# Patient Record
Sex: MALE | Race: WHITE | NOT HISPANIC OR LATINO | Employment: OTHER | URBAN - METROPOLITAN AREA
[De-identification: names, ages, dates, MRNs, and addresses within clinical notes are randomized per-mention and may not be internally consistent; named-entity substitution may affect disease eponyms.]

---

## 2020-05-27 ENCOUNTER — HOSPITAL ENCOUNTER (OUTPATIENT)
Facility: MEDICAL CENTER | Age: 65
End: 2020-05-27
Attending: PHYSICIAN ASSISTANT
Payer: COMMERCIAL

## 2020-05-27 ENCOUNTER — HOSPITAL ENCOUNTER (OUTPATIENT)
Dept: RADIOLOGY | Facility: MEDICAL CENTER | Age: 65
End: 2020-05-27
Attending: PHYSICIAN ASSISTANT
Payer: COMMERCIAL

## 2020-05-27 ENCOUNTER — APPOINTMENT (OUTPATIENT)
Dept: RADIOLOGY | Facility: IMAGING CENTER | Age: 65
End: 2020-05-27
Attending: PHYSICIAN ASSISTANT
Payer: COMMERCIAL

## 2020-05-27 ENCOUNTER — OFFICE VISIT (OUTPATIENT)
Dept: URGENT CARE | Facility: CLINIC | Age: 65
End: 2020-05-27
Payer: COMMERCIAL

## 2020-05-27 VITALS
HEART RATE: 102 BPM | SYSTOLIC BLOOD PRESSURE: 112 MMHG | OXYGEN SATURATION: 95 % | RESPIRATION RATE: 14 BRPM | TEMPERATURE: 97.3 F | DIASTOLIC BLOOD PRESSURE: 92 MMHG | BODY MASS INDEX: 28.18 KG/M2 | HEIGHT: 73 IN | WEIGHT: 212.6 LBS

## 2020-05-27 DIAGNOSIS — R51.9 ACUTE NONINTRACTABLE HEADACHE, UNSPECIFIED HEADACHE TYPE: ICD-10-CM

## 2020-05-27 DIAGNOSIS — G44.85 PRIMARY STABBING HEADACHE: ICD-10-CM

## 2020-05-27 DIAGNOSIS — R50.9 FEVER, UNSPECIFIED FEVER CAUSE: ICD-10-CM

## 2020-05-27 LAB
ANION GAP SERPL CALC-SCNC: 15 MMOL/L (ref 7–16)
APPEARANCE UR: NORMAL
BASOPHILS # BLD AUTO: 0.3 % (ref 0–1.8)
BASOPHILS # BLD: 0.02 K/UL (ref 0–0.12)
BILIRUB UR STRIP-MCNC: NEGATIVE MG/DL
BUN SERPL-MCNC: 21 MG/DL (ref 8–22)
CALCIUM SERPL-MCNC: 8.3 MG/DL (ref 8.5–10.5)
CHLORIDE SERPL-SCNC: 98 MMOL/L (ref 96–112)
CO2 SERPL-SCNC: 21 MMOL/L (ref 20–33)
COLOR UR AUTO: NORMAL
CREAT SERPL-MCNC: 1.54 MG/DL (ref 0.5–1.4)
EOSINOPHIL # BLD AUTO: 0.04 K/UL (ref 0–0.51)
EOSINOPHIL NFR BLD: 0.5 % (ref 0–6.9)
ERYTHROCYTE [DISTWIDTH] IN BLOOD BY AUTOMATED COUNT: 46.6 FL (ref 35.9–50)
FLUAV+FLUBV AG SPEC QL IA: NEGATIVE
GLUCOSE SERPL-MCNC: 130 MG/DL (ref 65–99)
GLUCOSE UR STRIP.AUTO-MCNC: NEGATIVE MG/DL
HCT VFR BLD AUTO: 38.6 % (ref 42–52)
HGB BLD-MCNC: 12.6 G/DL (ref 14–18)
IMM GRANULOCYTES # BLD AUTO: 0.1 K/UL (ref 0–0.11)
IMM GRANULOCYTES NFR BLD AUTO: 1.3 % (ref 0–0.9)
INT CON NEG: NEGATIVE
INT CON POS: POSITIVE
KETONES UR STRIP.AUTO-MCNC: NEGATIVE MG/DL
LEUKOCYTE ESTERASE UR QL STRIP.AUTO: NEGATIVE
LYMPHOCYTES # BLD AUTO: 0.53 K/UL (ref 1–4.8)
LYMPHOCYTES NFR BLD: 7 % (ref 22–41)
MCH RBC QN AUTO: 28.9 PG (ref 27–33)
MCHC RBC AUTO-ENTMCNC: 32.6 G/DL (ref 33.7–35.3)
MCV RBC AUTO: 88.5 FL (ref 81.4–97.8)
MONOCYTES # BLD AUTO: 0.22 K/UL (ref 0–0.85)
MONOCYTES NFR BLD AUTO: 2.9 % (ref 0–13.4)
NEUTROPHILS # BLD AUTO: 6.63 K/UL (ref 1.82–7.42)
NEUTROPHILS NFR BLD: 88 % (ref 44–72)
NITRITE UR QL STRIP.AUTO: NEGATIVE
NRBC # BLD AUTO: 0 K/UL
NRBC BLD-RTO: 0 /100 WBC
PH UR STRIP.AUTO: 5.5 [PH] (ref 5–8)
PLATELET # BLD AUTO: 219 K/UL (ref 164–446)
PMV BLD AUTO: 9.9 FL (ref 9–12.9)
POTASSIUM SERPL-SCNC: 4.1 MMOL/L (ref 3.6–5.5)
PROT UR QL STRIP: NEGATIVE MG/DL
RBC # BLD AUTO: 4.36 M/UL (ref 4.7–6.1)
RBC UR QL AUTO: NEGATIVE
SODIUM SERPL-SCNC: 134 MMOL/L (ref 135–145)
SP GR UR STRIP.AUTO: 1.02
UROBILINOGEN UR STRIP-MCNC: 0.2 MG/DL
WBC # BLD AUTO: 7.5 K/UL (ref 4.8–10.8)

## 2020-05-27 PROCEDURE — 70450 CT HEAD/BRAIN W/O DYE: CPT

## 2020-05-27 PROCEDURE — 81002 URINALYSIS NONAUTO W/O SCOPE: CPT | Performed by: PHYSICIAN ASSISTANT

## 2020-05-27 PROCEDURE — 87086 URINE CULTURE/COLONY COUNT: CPT

## 2020-05-27 PROCEDURE — 85025 COMPLETE CBC W/AUTO DIFF WBC: CPT

## 2020-05-27 PROCEDURE — 99214 OFFICE O/P EST MOD 30 MIN: CPT | Performed by: PHYSICIAN ASSISTANT

## 2020-05-27 PROCEDURE — 71046 X-RAY EXAM CHEST 2 VIEWS: CPT | Mod: TC | Performed by: PHYSICIAN ASSISTANT

## 2020-05-27 PROCEDURE — 87804 INFLUENZA ASSAY W/OPTIC: CPT | Performed by: PHYSICIAN ASSISTANT

## 2020-05-27 PROCEDURE — 80048 BASIC METABOLIC PNL TOTAL CA: CPT

## 2020-05-27 ASSESSMENT — ENCOUNTER SYMPTOMS
NECK PAIN: 0
SPUTUM PRODUCTION: 0
SPEECH CHANGE: 0
TINGLING: 0
WHEEZING: 0
SHORTNESS OF BREATH: 1
STRIDOR: 0
CHILLS: 1
DIZZINESS: 0
PALPITATIONS: 0
WEIGHT LOSS: 0
HEARTBURN: 0
FLANK PAIN: 0
SENSORY CHANGE: 0
SINUS PAIN: 0
COUGH: 0
FOCAL WEAKNESS: 0
ABDOMINAL PAIN: 0
WEAKNESS: 0
DIARRHEA: 0
NAUSEA: 1
DIAPHORESIS: 0
MYALGIAS: 1
SORE THROAT: 0
VOMITING: 0
HEADACHES: 1
EYE PAIN: 0
FEVER: 1

## 2020-05-27 NOTE — PROGRESS NOTES
Subjective:   Ezra Obrien is a 65 y.o. male who presents for Fever (x 2 days.  Fever, bodyaches, headache and chills.  L foot is sore. Hx of Asthma.  Pt. tested negative for Covid 19. )    HPI:  This is a very pleasant 65-year-old male presenting to the clinic with fever, chills, body aches and headache x 2 days.  The patient states it is becoming more severe over the last 2 days.  He describes his headache as left-sided and stabbing in nature.  He does describe this is the worst headache he has ever had.  He has used Tylenol this morning and it did provide some relief to the headache.  Sitting in clinic he is not in extreme pain but does notice some visual changes.  He also describes a mild amount of shortness of breath and generalized body chills.  The patient was tested for COVID-19 2 weeks ago with a negative result.  He also experienced a severe flareup of gout in his left thumb and second digit.  He was seen at [facility] for this problem.  He was prescribed indomethacin but also concurrently treated with Bactrim and amoxicillin.  He states that the swelling started to go down but he stopped taking the antibiotics due to feeling nauseous.  The fingers then progressively became more swollen and painful.  He denies being bit or punctured by anything.  The patient currently denies any chest pain, neck pain, sore throat, dysuria, paresthesias of the limbs, change in speech or droopiness of the face.        Review of Systems   Constitutional: Positive for chills, fever and malaise/fatigue. Negative for diaphoresis and weight loss.   HENT: Negative for congestion, ear pain, sinus pain and sore throat.    Eyes: Positive for blurred vision. Negative for pain.   Respiratory: Positive for shortness of breath. Negative for cough, sputum production, wheezing and stridor.    Cardiovascular: Negative for chest pain, palpitations and leg swelling.   Gastrointestinal: Positive for nausea (While taking  antibiotics.). Negative for abdominal pain, diarrhea, heartburn and vomiting.   Genitourinary: Positive for frequency and urgency. Negative for dysuria, flank pain and hematuria.   Musculoskeletal: Positive for myalgias. Negative for neck pain.   Skin: Negative for rash.   Neurological: Positive for headaches (Left-sided headache.  This is the worst headache she has had.  No photophobia or phonophobia.  No change in vision.  Nothing seems to make it worse.  Tylenol slightly relieves the pain.). Negative for dizziness, tingling, sensory change, speech change, focal weakness and weakness.   Endo/Heme/Allergies: Negative for environmental allergies.       Medications:    • This patient does not have an active medication from one of the medication groupers.    Allergies: Celebrex [celecoxib] and Shellfish allergy    Problem List: Ezra Obrien has Transient arthropathy of shoulder and Complete rotator cuff tear on their problem list.    Surgical History:  Past Surgical History:   Procedure Laterality Date   • SHOULDER DECOMPRESSION ARTHROSCOPIC Left 3/1/2016    Procedure: SHOULDER DECOMPRESSION ARTHROSCOPIC SUBACROMIAL;  Surgeon: Maral Lopez M.D.;  Location: SURGERY [facility];  Service:    • SHOULDER ARTHROSCOPY W/ ROTATOR CUFF REPAIR Left 3/1/2016    Procedure: SHOULDER ARTHROSCOPY  with  SUPERIOR CAPSULAR RECONSTRUCTION; Biceps Tenodesis;  Surgeon: Maral Lopez M.D.;  Location: SURGERY [facility];  Service:    • COLONOSCOPY  2015   • INGUINAL HERNIA REPAIR Right 1962       Past Social Hx: Ezra Obrien  reports that he has never smoked. He has never used smokeless tobacco. He reports current alcohol use. He reports that he does not use drugs.     Past Family Hx:  Ezra Obrien family history is not on file.     Problem list, medications, and allergies reviewed by myself today in Epic.     Objective:     /92   Pulse (!) 102   Temp 36.3 °C (97.3 °F) (Temporal)    "Resp 14   Ht 1.854 m (6' 1\")   Wt 96.4 kg (212 lb 9.6 oz)   SpO2 95%   BMI 28.05 kg/m²     Physical Exam  Constitutional:       Appearance: Normal appearance. He is not toxic-appearing or diaphoretic.   HENT:      Head: Normocephalic and atraumatic.      Right Ear: Tympanic membrane, ear canal and external ear normal.      Left Ear: Tympanic membrane, ear canal and external ear normal.      Nose: Nose normal. No congestion or rhinorrhea.      Mouth/Throat:      Mouth: Mucous membranes are moist.      Pharynx: No oropharyngeal exudate or posterior oropharyngeal erythema.   Eyes:      Extraocular Movements: Extraocular movements intact.      Conjunctiva/sclera: Conjunctivae normal.      Pupils: Pupils are equal, round, and reactive to light.   Neck:      Musculoskeletal: Normal range of motion. No neck rigidity or muscular tenderness.      Vascular: No carotid bruit.   Cardiovascular:      Rate and Rhythm: Regular rhythm. Tachycardia present.      Pulses: Normal pulses.      Heart sounds: Normal heart sounds.   Pulmonary:      Effort: Pulmonary effort is normal.      Breath sounds: Normal breath sounds. No wheezing, rhonchi or rales.   Abdominal:      Palpations: Abdomen is soft.      Tenderness: There is no abdominal tenderness. There is no right CVA tenderness or left CVA tenderness.   Musculoskeletal:      Left hand: He exhibits bony tenderness. He exhibits normal capillary refill and no laceration. Decreased strength noted. He exhibits thumb/finger opposition.        Hands:       Right lower leg: No edema.      Left lower leg: No edema.      Comments: Edematous and erythematous digits with tenderness to IP joint. Pt states he was dx with gout at [facility]. Decreased IP flexion. No pain to palpation along the tendon sheath. No ascending redness. Full sensation.    Lymphadenopathy:      Cervical: No cervical adenopathy.   Skin:     General: Skin is warm.      Capillary Refill: Capillary refill takes less than 2 " seconds.   Neurological:      General: No focal deficit present.      Mental Status: He is alert and oriented to person, place, and time. Mental status is at baseline.      Cranial Nerves: No cranial nerve deficit.      Sensory: No sensory deficit.      Motor: No weakness.      Coordination: Coordination normal.      Gait: Gait normal.   Psychiatric:         Mood and Affect: Mood normal.         Thought Content: Thought content normal.     Rapid flu: Negative  Urine analysis: Within normal limits  Urine culture: Pending  Chest x-ray: No acute cardiopulmonary disease.    CT Head:   IMPRESSION:     1. Cerebral atrophy.  2. White matter lucencies most consistent with small vessel ischemic change versus demyelination or gliosis.  3. Otherwise, Head CT without contrast with no acute findings. No evidence of acute cerebral infarction, hemorrhage or mass lesion.    CBC: Shows signs of normocytic anemia.  Increase in neutrophils.  Decreased lymphocytes.  WBCs normal  BMP: Increased creatinine.  GFR: 45    Assessment/Plan:     Diagnosis and associated orders:   1. Fever, unspecified fever cause  - CBC WITH DIFFERENTIAL; see scanned in  - Basic Metabolic Panel; see scanned in    2. Acute nonintractable headache, unspecified headache type  -Head CT see scanned in    3. Primary stabbing headache  May take Tylenol every 8 hours as needed for pain do not exceed today     Comments/MDM:     I spoke with the patient reviewing his CT and laboratory results.  I recommended immediate follow-up with his PCP.  He states he has an appointment with Dr. Walden tomorrow.  I stressed strict ER precautions for any worsening of symptoms.  The patient agreed to this plan of care.  I will follow-up tomorrow.           Differential diagnosis, natural history, supportive care, and indications for immediate follow-up discussed.    Advised the patient to follow-up with the primary care physician for recheck, reevaluation, and consideration of  further management.    Please note that this dictation was created using voice recognition software. I have made reasonable attempt to correct obvious errors, but I expect that there are errors of grammar and possibly content that I did not discover before finalizing the note.    This note was electronically signed by PATRICIA Ley PA-C

## 2020-05-28 ASSESSMENT — ENCOUNTER SYMPTOMS: BLURRED VISION: 1

## 2020-05-29 ENCOUNTER — TELEPHONE (OUTPATIENT)
Dept: URGENT CARE | Facility: CLINIC | Age: 65
End: 2020-05-29

## 2020-05-29 LAB
BACTERIA UR CULT: NORMAL
SIGNIFICANT IND 70042: NORMAL
SITE SITE: NORMAL
SOURCE SOURCE: NORMAL

## 2020-10-25 ENCOUNTER — OFFICE VISIT (OUTPATIENT)
Dept: URGENT CARE | Facility: CLINIC | Age: 65
End: 2020-10-25
Payer: MEDICARE

## 2020-10-25 ENCOUNTER — HOSPITAL ENCOUNTER (OUTPATIENT)
Facility: MEDICAL CENTER | Age: 65
End: 2020-10-25
Attending: PHYSICIAN ASSISTANT
Payer: MEDICARE

## 2020-10-25 ENCOUNTER — APPOINTMENT (OUTPATIENT)
Dept: RADIOLOGY | Facility: IMAGING CENTER | Age: 65
End: 2020-10-25
Attending: PHYSICIAN ASSISTANT
Payer: MEDICARE

## 2020-10-25 VITALS
RESPIRATION RATE: 16 BRPM | SYSTOLIC BLOOD PRESSURE: 140 MMHG | HEART RATE: 72 BPM | OXYGEN SATURATION: 96 % | DIASTOLIC BLOOD PRESSURE: 68 MMHG | WEIGHT: 215 LBS | HEIGHT: 66 IN | BODY MASS INDEX: 34.55 KG/M2 | TEMPERATURE: 97.1 F

## 2020-10-25 DIAGNOSIS — Z87.39 HX OF GOUT: ICD-10-CM

## 2020-10-25 DIAGNOSIS — M25.562 ACUTE PAIN OF LEFT KNEE: ICD-10-CM

## 2020-10-25 DIAGNOSIS — M25.562 ACUTE PAIN OF LEFT KNEE: Primary | ICD-10-CM

## 2020-10-25 LAB
BASOPHILS # BLD AUTO: 0.6 % (ref 0–1.8)
BASOPHILS # BLD: 0.06 K/UL (ref 0–0.12)
EOSINOPHIL # BLD AUTO: 0.2 K/UL (ref 0–0.51)
EOSINOPHIL NFR BLD: 2.1 % (ref 0–6.9)
ERYTHROCYTE [DISTWIDTH] IN BLOOD BY AUTOMATED COUNT: 47.2 FL (ref 35.9–50)
HCT VFR BLD AUTO: 41.2 % (ref 42–52)
HGB BLD-MCNC: 13.4 G/DL (ref 14–18)
IMM GRANULOCYTES # BLD AUTO: 0.03 K/UL (ref 0–0.11)
IMM GRANULOCYTES NFR BLD AUTO: 0.3 % (ref 0–0.9)
LYMPHOCYTES # BLD AUTO: 2.14 K/UL (ref 1–4.8)
LYMPHOCYTES NFR BLD: 22.6 % (ref 22–41)
MCH RBC QN AUTO: 29.5 PG (ref 27–33)
MCHC RBC AUTO-ENTMCNC: 32.5 G/DL (ref 33.7–35.3)
MCV RBC AUTO: 90.7 FL (ref 81.4–97.8)
MONOCYTES # BLD AUTO: 0.78 K/UL (ref 0–0.85)
MONOCYTES NFR BLD AUTO: 8.2 % (ref 0–13.4)
NEUTROPHILS # BLD AUTO: 6.25 K/UL (ref 1.82–7.42)
NEUTROPHILS NFR BLD: 66.2 % (ref 44–72)
NRBC # BLD AUTO: 0 K/UL
NRBC BLD-RTO: 0 /100 WBC
PLATELET # BLD AUTO: 236 K/UL (ref 164–446)
PMV BLD AUTO: 10.3 FL (ref 9–12.9)
RBC # BLD AUTO: 4.54 M/UL (ref 4.7–6.1)
URATE SERPL-MCNC: 3.6 MG/DL (ref 2.5–8.3)
WBC # BLD AUTO: 9.5 K/UL (ref 4.8–10.8)

## 2020-10-25 PROCEDURE — 85025 COMPLETE CBC W/AUTO DIFF WBC: CPT

## 2020-10-25 PROCEDURE — 99214 OFFICE O/P EST MOD 30 MIN: CPT | Performed by: PHYSICIAN ASSISTANT

## 2020-10-25 PROCEDURE — 73564 X-RAY EXAM KNEE 4 OR MORE: CPT | Mod: TC,LT | Performed by: PHYSICIAN ASSISTANT

## 2020-10-25 PROCEDURE — 84550 ASSAY OF BLOOD/URIC ACID: CPT

## 2020-10-25 RX ORDER — FEBUXOSTAT 80 MG/1
80 TABLET, FILM COATED ORAL
COMMUNITY
Start: 2020-09-16 | End: 2023-12-12

## 2020-10-25 RX ORDER — INDOMETHACIN 50 MG/1
50 CAPSULE ORAL
COMMUNITY
Start: 2020-09-17 | End: 2023-12-12

## 2020-10-25 RX ORDER — PREDNISONE 10 MG/1
TABLET ORAL
Qty: 18 TAB | Refills: 0 | Status: SHIPPED | OUTPATIENT
Start: 2020-10-25 | End: 2023-12-12

## 2020-10-25 ASSESSMENT — ENCOUNTER SYMPTOMS
ABDOMINAL PAIN: 0
FALLS: 0
COUGH: 0
VOMITING: 0
CHILLS: 0
FEVER: 0
TINGLING: 1
CONSTIPATION: 0
NAUSEA: 0
DIARRHEA: 0
SHORTNESS OF BREATH: 0

## 2020-10-25 NOTE — PROGRESS NOTES
Subjective:   Ezra Obrien is a 65 y.o. male who presents for Knee Pain (he was on his knees, on going a long time 30-40 years, they hurt  and puff up goes away 1-2 days, both and left side is worst x wednesday took advil but nothing seems to work, used knee compression got a little worse, lifting or bendiing is hurts )        HPI   Presents to urgent care today for left knee pain.  He states he has had chronic knee pain off and on for the past 30 years.  He states he was leaning on both knees for prolonged period of time on Wednesday.  Shortly after he started experiencing left knee pain and swelling.  He has been taking Aleve every 12 hours with minimal relief.  The pain is mild at rest but can flare to 10 out of 10 with movement and palpation.  He does have some intermittent numbness and tingling distally to the left knee.  He denies previous fractures or surgeries to left knee.  He does have a history of gout and has been working with his primary care provider on changing his diet.  He currently takes febuxostat daily.  He has never had a gout flare to the knees.  Gout flares occur frequently to great toes and hands.  No fever, chills.    Review of Systems   Constitutional: Negative for chills, fever and malaise/fatigue.   Respiratory: Negative for cough and shortness of breath.    Gastrointestinal: Negative for abdominal pain, constipation, diarrhea, nausea and vomiting.   Musculoskeletal: Positive for joint pain (L knee). Negative for falls.   Neurological: Positive for tingling (intermittent).   All other systems reviewed and are negative.      PMH:  has a past medical history of Alcohol use (2016) and High cholesterol.  MEDS:   Current Outpatient Medications:   •  predniSONE (DELTASONE) 10 MG Tab, Take 30 mg daily x 3 days then decrease to 20 mg daily x 3 days then 10 mg daily x 3 days., Disp: 18 Tab, Rfl: 0  •  febuxostat (ULORIC) 80 MG Tab, Take 80 mg by mouth every day., Disp: , Rfl:   •   "indomethacin (INDOCIN) 50 MG Cap, Take 50 mg by mouth., Disp: , Rfl:   ALLERGIES:   Allergies   Allergen Reactions   • Celebrex [Celecoxib]    • Shellfish Allergy      SURGHX:   Past Surgical History:   Procedure Laterality Date   • SHOULDER DECOMPRESSION ARTHROSCOPIC Left 3/1/2016    Procedure: SHOULDER DECOMPRESSION ARTHROSCOPIC SUBACROMIAL;  Surgeon: Maral Lopez M.D.;  Location: SURGERY [facility];  Service:    • SHOULDER ARTHROSCOPY W/ ROTATOR CUFF REPAIR Left 3/1/2016    Procedure: SHOULDER ARTHROSCOPY  with  SUPERIOR CAPSULAR RECONSTRUCTION; Biceps Tenodesis;  Surgeon: Maral Lopez M.D.;  Location: SURGERY [facility];  Service:    • COLONOSCOPY  2015   • INGUINAL HERNIA REPAIR Right 1962     SOCHX:  reports that he has never smoked. He has never used smokeless tobacco. He reports current alcohol use. He reports that he does not use drugs.  History reviewed. No pertinent family history.     Objective:   /68   Pulse 72   Temp 36.2 °C (97.1 °F) (Temporal)   Resp 16   Ht 1.676 m (5' 6\")   Wt 97.5 kg (215 lb)   SpO2 96%   BMI 34.70 kg/m²     Physical Exam  Vitals signs reviewed.   Constitutional:       General: He is not in acute distress.     Appearance: He is well-developed.   HENT:      Head: Normocephalic and atraumatic.      Right Ear: External ear normal.      Left Ear: External ear normal.      Nose: Nose normal.      Mouth/Throat:      Mouth: Mucous membranes are moist.   Eyes:      Conjunctiva/sclera: Conjunctivae normal.      Pupils: Pupils are equal, round, and reactive to light.   Neck:      Musculoskeletal: Normal range of motion and neck supple.      Trachea: No tracheal deviation.   Cardiovascular:      Rate and Rhythm: Normal rate and regular rhythm.   Pulmonary:      Effort: Pulmonary effort is normal.      Breath sounds: Normal breath sounds.   Musculoskeletal:      Left knee: He exhibits decreased range of motion, swelling and effusion. He exhibits no " ecchymosis. Tenderness (diffuse, exam limited due to pt pain) found.      Comments: Patient unable to sit on exam table.  Pain is worse with flexion of knee.  N/V intact.   Skin:     General: Skin is warm and dry.      Capillary Refill: Capillary refill takes less than 2 seconds.   Neurological:      General: No focal deficit present.      Mental Status: He is alert and oriented to person, place, and time.   Psychiatric:         Mood and Affect: Mood normal.         Behavior: Behavior normal.          Knee XR IMPRESSION:     1.  Mild anterior soft tissue swelling and large joint effusion present.  2.  No fracture or dislocation of LEFT knee.  3.  Minimal degenerative changes.      Assessment/Plan:     1. Acute pain of left knee  DX-KNEE COMPLETE 4+ LEFT    predniSONE (DELTASONE) 10 MG Tab    CBC WITH DIFFERENTIAL    URIC ACID    REFERRAL TO FOLLOW-UP WITH PRIMARY CARE   2. Hx of gout  DX-KNEE COMPLETE 4+ LEFT    predniSONE (DELTASONE) 10 MG Tab    URIC ACID     Reviewed-no leukocytosis or elevation of uric acid.    Supportive care reviewed.  Monitor symptoms closely.  He will be treated with prednisone.  Continue to rest, ice, elevate.  Gout educational handout provided.    Follow-up with primary care provider within 7-10 days, referral placed.  If symptoms worsen or persist patient can return to clinic for reevaluation.  Red flags and emergency room precautions discussed. All side effects of medication discussed including allergic response, GI upset, tendon injury, etc. Patient confirmed understanding of information.    Please note that this dictation was created using voice recognition software. I have made every reasonable attempt to correct obvious errors, but I expect that there are errors of grammar and possibly content that I did not discover before finalizing the note.

## 2021-03-03 DIAGNOSIS — Z23 NEED FOR VACCINATION: ICD-10-CM

## 2021-10-26 ENCOUNTER — APPOINTMENT (RX ONLY)
Dept: URBAN - METROPOLITAN AREA CLINIC 4 | Facility: CLINIC | Age: 66
Setting detail: DERMATOLOGY
End: 2021-10-26

## 2021-10-26 DIAGNOSIS — L30.8 OTHER SPECIFIED DERMATITIS: ICD-10-CM | Status: WORSENING

## 2021-10-26 DIAGNOSIS — Z71.89 OTHER SPECIFIED COUNSELING: ICD-10-CM

## 2021-10-26 DIAGNOSIS — L21.8 OTHER SEBORRHEIC DERMATITIS: ICD-10-CM | Status: WORSENING

## 2021-10-26 DIAGNOSIS — R20.2 PARESTHESIA OF SKIN: ICD-10-CM | Status: INADEQUATELY CONTROLLED

## 2021-10-26 PROBLEM — B35.6 TINEA CRURIS: Status: ACTIVE | Noted: 2021-10-26

## 2021-10-26 PROBLEM — L30.9 DERMATITIS, UNSPECIFIED: Status: ACTIVE | Noted: 2021-10-26

## 2021-10-26 PROCEDURE — ? DIAGNOSIS COMMENT

## 2021-10-26 PROCEDURE — ? TREATMENT REGIMEN

## 2021-10-26 PROCEDURE — ? PRESCRIPTION

## 2021-10-26 PROCEDURE — 99204 OFFICE O/P NEW MOD 45 MIN: CPT

## 2021-10-26 PROCEDURE — ? COUNSELING

## 2021-10-26 RX ORDER — KETOCONAZOLE 20 MG/G
1 CREAM TOPICAL BID
Qty: 60 | Refills: 2 | Status: ERX | COMMUNITY
Start: 2021-10-26

## 2021-10-26 RX ORDER — KETOCONAZOLE 20 MG/ML
1 SHAMPOO, SUSPENSION TOPICAL TIW
Qty: 120 | Refills: 3 | Status: ERX | COMMUNITY
Start: 2021-10-26

## 2021-10-26 RX ORDER — TRIAMCINOLONE ACETONIDE 1 MG/G
1 OINTMENT TOPICAL BID
Qty: 80 | Refills: 1 | Status: ERX | COMMUNITY
Start: 2021-10-26

## 2021-10-26 RX ORDER — TACROLIMUS 1 MG/G
1 OINTMENT TOPICAL QD
Qty: 60 | Refills: 3 | Status: ERX | COMMUNITY
Start: 2021-10-26

## 2021-10-26 RX ORDER — HALOBETASOL PROPIONATE 0.5 MG/G
1 OINTMENT TOPICAL BID
Qty: 50 | Refills: 2 | Status: ERX | COMMUNITY
Start: 2021-10-26

## 2021-10-26 RX ADMIN — KETOCONAZOLE 1: 20 CREAM TOPICAL at 00:00

## 2021-10-26 RX ADMIN — HALOBETASOL PROPIONATE 1: 0.5 OINTMENT TOPICAL at 00:00

## 2021-10-26 RX ADMIN — TACROLIMUS 1: 1 OINTMENT TOPICAL at 00:00

## 2021-10-26 RX ADMIN — KETOCONAZOLE 1: 20 SHAMPOO, SUSPENSION TOPICAL at 00:00

## 2021-10-26 RX ADMIN — TRIAMCINOLONE ACETONIDE 1: 1 OINTMENT TOPICAL at 00:00

## 2021-10-26 ASSESSMENT — LOCATION SIMPLE DESCRIPTION DERM
LOCATION SIMPLE: LOWER BACK
LOCATION SIMPLE: RIGHT UPPER BACK
LOCATION SIMPLE: SCALP
LOCATION SIMPLE: RIGHT HAND
LOCATION SIMPLE: LEFT HAND
LOCATION SIMPLE: POSTERIOR SCALP

## 2021-10-26 ASSESSMENT — LOCATION DETAILED DESCRIPTION DERM
LOCATION DETAILED: RIGHT RADIAL DORSAL HAND
LOCATION DETAILED: MID-OCCIPITAL SCALP
LOCATION DETAILED: SUPERIOR LUMBAR SPINE
LOCATION DETAILED: RIGHT SUPERIOR UPPER BACK
LOCATION DETAILED: LEFT RADIAL DORSAL HAND
LOCATION DETAILED: RIGHT INFERIOR FRONTAL SCALP

## 2021-10-26 ASSESSMENT — LOCATION ZONE DERM
LOCATION ZONE: TRUNK
LOCATION ZONE: SCALP
LOCATION ZONE: HAND

## 2021-10-26 NOTE — PROCEDURE: TREATMENT REGIMEN
Initiate Treatment: Head/Scalp: Ketoconazole shampoo 2/3 times a week, leave in hair for 5 minutes before washing out when showering. Protopic ointment to scaly areas on scalp and face at night. \\n\\nHands: Halobetasol ointment twice a day for 2/3 weeks\\n\\nGroin/Gluteal Cleft: Ketoconazole cream twice a day\\n\\nBack: Triamcinolone ointment twice a day for 2/3 weeks. \\n\\nFeet: Triamcinolone ointment between toes twice a day for 2 weeks, then stop.
Detail Level: Zone

## 2021-10-26 NOTE — PROCEDURE: DIAGNOSIS COMMENT
Render Risk Assessment In Note?: no
Detail Level: Simple
Comment: Has used TAC on and off for a year.
Comment: Hx of eczema. Has some know allergies from food allergen test when the patient was younger. States it’s gotten worse when using certain gloves at work. Will schedule for patch testing.

## 2021-12-03 ENCOUNTER — APPOINTMENT (RX ONLY)
Dept: URBAN - METROPOLITAN AREA CLINIC 4 | Facility: CLINIC | Age: 66
Setting detail: DERMATOLOGY
End: 2021-12-03

## 2021-12-03 DIAGNOSIS — Z71.89 OTHER SPECIFIED COUNSELING: ICD-10-CM

## 2021-12-03 DIAGNOSIS — R20.2 PARESTHESIA OF SKIN: ICD-10-CM

## 2021-12-03 DIAGNOSIS — L30.8 OTHER SPECIFIED DERMATITIS: ICD-10-CM | Status: IMPROVED

## 2021-12-03 DIAGNOSIS — L21.8 OTHER SEBORRHEIC DERMATITIS: ICD-10-CM | Status: RESOLVING

## 2021-12-03 PROBLEM — B35.6 TINEA CRURIS: Status: ACTIVE | Noted: 2021-12-03

## 2021-12-03 PROBLEM — L30.9 DERMATITIS, UNSPECIFIED: Status: ACTIVE | Noted: 2021-12-03

## 2021-12-03 PROCEDURE — ? TREATMENT REGIMEN

## 2021-12-03 PROCEDURE — 99213 OFFICE O/P EST LOW 20 MIN: CPT

## 2021-12-03 PROCEDURE — ? DIAGNOSIS COMMENT

## 2021-12-03 PROCEDURE — ? COUNSELING

## 2021-12-03 ASSESSMENT — LOCATION ZONE DERM
LOCATION ZONE: SCALP
LOCATION ZONE: HAND
LOCATION ZONE: TRUNK

## 2021-12-03 ASSESSMENT — LOCATION DETAILED DESCRIPTION DERM
LOCATION DETAILED: RIGHT RADIAL DORSAL HAND
LOCATION DETAILED: RIGHT SUPERIOR UPPER BACK
LOCATION DETAILED: RIGHT INFERIOR FRONTAL SCALP
LOCATION DETAILED: SUPERIOR LUMBAR SPINE
LOCATION DETAILED: LEFT RADIAL DORSAL HAND
LOCATION DETAILED: MID-OCCIPITAL SCALP

## 2021-12-03 ASSESSMENT — LOCATION SIMPLE DESCRIPTION DERM
LOCATION SIMPLE: RIGHT HAND
LOCATION SIMPLE: RIGHT UPPER BACK
LOCATION SIMPLE: SCALP
LOCATION SIMPLE: POSTERIOR SCALP
LOCATION SIMPLE: LEFT HAND
LOCATION SIMPLE: LOWER BACK

## 2021-12-03 NOTE — PROCEDURE: DIAGNOSIS COMMENT
Comment: 12/3 Will run PA for patch testing including metal series. Pt has had some improvement from topical regimen, will now use 3x a week as a maintenance for hands. \\n\\nPrior: Hx of eczema. Has some know allergies from food allergen test when the patient was younger. States it’s gotten worse when using certain gloves at work. Will schedule for patch testing.
Render Risk Assessment In Note?: no
Detail Level: Simple
Comment: Improved from topical regimen. \\n\\nPrior: Has used TAC on and off for a year.
Comment: 12/1 Patient states ketoconazole cream clears rash, but comes back after a while of stopping the cream. Counseled in doing a maintenance dose.

## 2022-02-02 ENCOUNTER — APPOINTMENT (RX ONLY)
Dept: URBAN - METROPOLITAN AREA CLINIC 4 | Facility: CLINIC | Age: 67
Setting detail: DERMATOLOGY
End: 2022-02-02

## 2022-02-02 DIAGNOSIS — L259 CONTACT DERMATITIS AND OTHER ECZEMA, UNSPECIFIED CAUSE: ICD-10-CM

## 2022-02-02 DIAGNOSIS — Z71.89 OTHER SPECIFIED COUNSELING: ICD-10-CM

## 2022-02-02 PROBLEM — L30.9 DERMATITIS, UNSPECIFIED: Status: ACTIVE | Noted: 2022-02-02

## 2022-02-02 PROCEDURE — ? COUNSELING

## 2022-02-02 PROCEDURE — 99212 OFFICE O/P EST SF 10 MIN: CPT | Mod: 25

## 2022-02-02 PROCEDURE — 95044 PATCH/APPLICATION TESTS: CPT

## 2022-02-02 PROCEDURE — ? PATCH TESTING

## 2022-02-02 PROCEDURE — ? PHOTO-DOCUMENTATION

## 2022-02-02 PROCEDURE — ? DIAGNOSIS COMMENT

## 2022-02-02 ASSESSMENT — LOCATION DETAILED DESCRIPTION DERM
LOCATION DETAILED: INFERIOR THORACIC SPINE
LOCATION DETAILED: RIGHT SUPERIOR MEDIAL UPPER BACK

## 2022-02-02 ASSESSMENT — LOCATION ZONE DERM: LOCATION ZONE: TRUNK

## 2022-02-02 ASSESSMENT — LOCATION SIMPLE DESCRIPTION DERM
LOCATION SIMPLE: RIGHT UPPER BACK
LOCATION SIMPLE: UPPER BACK

## 2022-02-02 NOTE — PROCEDURE: DIAGNOSIS COMMENT
Comment: 2/2 Couldn’t get access to metal series, will run regular ACDS 80 panel. \\n\\n12/3 Will run PA for patch testing including metal series. Pt has had some improvement from topical regimen, will now use 3x a week as a maintenance for hands. \\n\\nPrior: Hx of eczema. Has some know allergies from food allergen test when the patient was younger. States it’s gotten worse when using certain gloves at work. Will schedule for patch testing.
Detail Level: Simple
Render Risk Assessment In Note?: no

## 2022-02-02 NOTE — HPI: TESTING (PATCH TESTING)
Has Your Rash Been Biopsied Before?: has not been biopsied previously
Have You Had Previous Patch Testing In The Past?: none
How Severe Is Your Rash At Its Worst?: moderate
What Is Your Occupation?: Retired

## 2022-02-04 ENCOUNTER — APPOINTMENT (RX ONLY)
Dept: URBAN - METROPOLITAN AREA CLINIC 4 | Facility: CLINIC | Age: 67
Setting detail: DERMATOLOGY
End: 2022-02-04

## 2022-02-04 DIAGNOSIS — L259 CONTACT DERMATITIS AND OTHER ECZEMA, UNSPECIFIED CAUSE: ICD-10-CM

## 2022-02-04 PROBLEM — L30.9 DERMATITIS, UNSPECIFIED: Status: ACTIVE | Noted: 2022-02-04

## 2022-02-04 PROCEDURE — ? PHOTO-DOCUMENTATION

## 2022-02-04 PROCEDURE — 99212 OFFICE O/P EST SF 10 MIN: CPT

## 2022-02-04 PROCEDURE — ? CORE ACDS PATCH TEST READING

## 2022-02-04 NOTE — PROCEDURE: CORE ACDS PATCH TEST READING
Name Of Allergen 70: ethyl hexyl glycerol
Allergen 68 Reaction: no reaction
Name Of Allergen 77: benzoic acid
Name Of Allergen 61: benzophenone chloride
Name Of Allergen 67: sorbitan sesquioleate
Number Of Patches Read: 80
Name Of Allergen 66: dimethylol dihydroxyethyleneurea
Name Of Allergen 34: fragrance mix 2
Name Of Allergen 44: oleamidopropyl dimethylamine
Name Of Allergen 16: black rubber mix
Name Of Allergen 55: 2-hydroxy-4-methoxybenzophenone
Name Of Allergen 74: ethyl cyanoacrylate
Name Of Allergen 38: iodopropyynyl betaine
Name Of Allergen 10: balsam of peru
Name Of Allergen 13: petrolatum-tert- butylphenol formaldehyde resin
Name Of Allergen 56: tosylamide formaldehyde resin
Name Of Allergen 47: lavandula angustifolia oil
Name Of Allergen 1: nickel sulfate hexahydrate
Name Of Allergen 50: ethyl acrylate
Name Of Allergen 41: mixed dialkyl thioureas
Name Of Allergen 39: polymyxin B sulfate
Name Of Allergen 79: 2-ethylhexyl-4-methoxycinnamate
Name Of Allergen 11: ethylenediamine dihydrochloride
Name Of Allergen 52: chlorhexidine diclugonate
Name Of Allergen 45: decyl glucoside
Name Of Allergen 57: sesquiterpenelactone mix
Name Of Allergen 35: disperse blue mix 124/106
Name Of Allergen 48: cinnamic aldehyde
Name Of Allergen 18: quaternium 15
Name Of Allergen 31: hydrocortisone-17-butyrate
Name Of Allergen 33: bacitracin
What Reading Time Point?: 48 hour
Name Of Allergen 37: propylene glycol
Name Of Allergen 15: carba mix
Name Of Allergen 32: 2-mercaptobenzothiazole
Name Of Allergen 24: thiuram mix A
Name Of Allergen 51: tea tree oil, oxidized
Name Of Allergen 76: disperse orange-3
Name Of Allergen 28: gold sodium thiosulfate
Show Allergen Counseling In The Note?: Yes
Name Of Allergen 36: lidocaine-hci
Name Of Allergen 46: methyl methacrylate
Detail Level: Zone
Name Of Allergen 49: dl alpha tocopherol
Name Of Allergen 27: tixocortol-21-pivalate
Name Of Allergen 21: formaldehyde
Name Of Allergen 29: imidazolidinyl urea
Name Of Allergen 58: cocunut diethanolamide
Name Of Allergen 53: propolis
Name Of Allergen 14: bisphenol A epoxy resin
Name Of Allergen 69: cetylstearyalcohol
Name Of Allergen 78: butylhydroxytoluene
Name Of Allergen 4: potassium dichromate
Name Of Allergen 2: amerchol L101
Name Of Allergen 65: compositae mix
Name Of Allergen 80: benzyl alcohol
Name Of Allergen 20: petrolatum-phenylenediamine
Name Of Allergen 75: phenoxyethanol
Name Of Allergen 9: methylisothiazolinone
Name Of Allergen 3: neomycin sulfate
Name Of Allergen 19: methyldibromo glutaronitrile
Name Of Allergen 12: cobalt 2 chloride hexahydrate
Name Of Allergen 64: cananga odorata
Name Of Allergen 68: 1,3-diphenylguanidine
Name Of Allergen 8: paraben mix B
Name Of Allergen 6: fragrance mix
Name Of Allergen 7: colophony
Name Of Allergen 60: benzalkonium chloride
Name Of Allergen 54: 4-chloro-3,5-xylenol
Name Of Allergen 5: DMDM hydantoin
Name Of Allergen 72: clobetasol-17-propionate
Name Of Allergen 40: cocamidopropyl betaine
Name Of Allergen 17: methylchloroisothiazolinone/methylisothiazolinone
Name Of Allergen 42: dimethylaminopropylamine
Name Of Allergen 59: 4-chloro-3-cresol
Name Of Allergen 22: mercapto mix A
Name Of Allergen 26: benzocaine
Name Of Allergen 23: 2-bromo-2-nitropropane-1,3-diol
Name Of Allergen 43: 2-hydroxyethyl-methacrylate
Name Of Allergen 30: budesonide
Name Of Allergen 63: sorbic acid
Name Of Allergen 73: amidoamine
Name Of Allergen 62: sodium benzoate
Name Of Allergen 71: triamcinalone acetonide
Name Of Allergen 25: diazolidinyl urea

## 2022-02-08 ENCOUNTER — APPOINTMENT (RX ONLY)
Dept: URBAN - METROPOLITAN AREA CLINIC 4 | Facility: CLINIC | Age: 67
Setting detail: DERMATOLOGY
End: 2022-02-08

## 2022-02-08 DIAGNOSIS — L259 CONTACT DERMATITIS AND OTHER ECZEMA, UNSPECIFIED CAUSE: ICD-10-CM

## 2022-02-08 PROBLEM — L30.8 OTHER SPECIFIED DERMATITIS: Status: ACTIVE | Noted: 2022-02-08

## 2022-02-08 PROBLEM — L30.9 DERMATITIS, UNSPECIFIED: Status: ACTIVE | Noted: 2022-02-08

## 2022-02-08 PROCEDURE — ? ADDITIONAL NOTES

## 2022-02-08 PROCEDURE — ? PRESCRIPTION

## 2022-02-08 PROCEDURE — ? COUNSELING

## 2022-02-08 PROCEDURE — ? DIAGNOSIS COMMENT

## 2022-02-08 PROCEDURE — 99214 OFFICE O/P EST MOD 30 MIN: CPT

## 2022-02-08 PROCEDURE — ? CORE ACDS PATCH TEST READING

## 2022-02-08 RX ORDER — CRISABOROLE 20 MG/G
1 OINTMENT TOPICAL QD
Qty: 60 | Refills: 0 | Status: ERX | COMMUNITY
Start: 2022-02-08

## 2022-02-08 RX ADMIN — CRISABOROLE 1: 20 OINTMENT TOPICAL at 00:00

## 2022-02-08 NOTE — PROCEDURE: DIAGNOSIS COMMENT
Comment: No allergen found on testing
Render Risk Assessment In Note?: no
Detail Level: Simple
Detail Level: Generalized
Comment: 2/8: Eczema as a child. Most likely an irritant. Discussed Dupixent and eucrisa. Pt decided on eucrisa. Sending RX to [facility] today. Return to office in 6 weeks. \\n\\nPrior: Couldn’t get access to metal series, will run regular ACDS 80 panel. \\n\\nPrior: Will run PA for patch testing including metal series. Pt has had some improvement from topical regimen, will now use 3x a week as a maintenance for hands. \\n\\nPrior: Hx of eczema. Has some know allergies from food allergen test when the patient was younger. States it’s gotten worse when using certain gloves at work. Will schedule for patch testing.

## 2022-02-08 NOTE — PROCEDURE: CORE ACDS PATCH TEST READING
Allergen 26 Reaction: no reaction
Name Of Allergen 64: cananga odorata
Name Of Allergen 52: chlorhexidine diclugonate
Name Of Allergen 16: black rubber mix
Name Of Allergen 3: neomycin sulfate
Name Of Allergen 32: 2-mercaptobenzothiazole
Name Of Allergen 11: ethylenediamine dihydrochloride
Name Of Allergen 58: cocunut diethanolamide
Name Of Allergen 73: amidoamine
Name Of Allergen 23: 2-bromo-2-nitropropane-1,3-diol
Name Of Allergen 78: butylhydroxytoluene
Name Of Allergen 77: benzoic acid
Name Of Allergen 71: triamcinalone acetonide
Name Of Allergen 29: imidazolidinyl urea
Name Of Allergen 15: carba mix
Name Of Allergen 8: paraben mix B
Name Of Allergen 14: bisphenol A epoxy resin
Name Of Allergen 62: sodium benzoate
Name Of Allergen 66: dimethylol dihydroxyethyleneurea
Name Of Allergen 34: fragrance mix 2
Name Of Allergen 70: ethyl hexyl glycerol
Name Of Allergen 47: lavandula angustifolia oil
Name Of Allergen 12: cobalt 2 chloride hexahydrate
Name Of Allergen 10: balsam of peru
Name Of Allergen 74: ethyl cyanoacrylate
Name Of Allergen 46: methyl methacrylate
Name Of Allergen 31: hydrocortisone-17-butyrate
Name Of Allergen 63: sorbic acid
Name Of Allergen 56: tosylamide formaldehyde resin
Name Of Allergen 44: oleamidopropyl dimethylamine
Name Of Allergen 49: dl alpha tocopherol
Name Of Allergen 43: 2-hydroxyethyl-methacrylate
Name Of Allergen 50: ethyl acrylate
Name Of Allergen 69: cetylstearyalcohol
Name Of Allergen 72: clobetasol-17-propionate
Name Of Allergen 75: phenoxyethanol
Name Of Allergen 40: cocamidopropyl betaine
Name Of Allergen 13: petrolatum-tert- butylphenol formaldehyde resin
Name Of Allergen 42: dimethylaminopropylamine
Name Of Allergen 18: quaternium 15
Name Of Allergen 67: sorbitan sesquioleate
Name Of Allergen 25: diazolidinyl urea
Name Of Allergen 80: benzyl alcohol
Name Of Allergen 37: propylene glycol
Name Of Allergen 61: benzophenone chloride
Name Of Allergen 30: budesonide
What Reading Time Point?: 168 hour
Name Of Allergen 17: methylchloroisothiazolinone/methylisothiazolinone
Name Of Allergen 35: disperse blue mix 124/106
Name Of Allergen 24: thiuram mix A
Name Of Allergen 28: gold sodium thiosulfate
Name Of Allergen 55: 2-hydroxy-4-methoxybenzophenone
Name Of Allergen 27: tixocortol-21-pivalate
Name Of Allergen 54: 4-chloro-3,5-xylenol
Name Of Allergen 48: cinnamic aldehyde
Name Of Allergen 2: amerchol L101
Name Of Allergen 68: 1,3-diphenylguanidine
Name Of Allergen 57: sesquiterpenelactone mix
Name Of Allergen 21: formaldehyde
Name Of Allergen 76: disperse orange-3
Name Of Allergen 33: bacitracin
Name Of Allergen 1: nickel sulfate hexahydrate
Name Of Allergen 39: polymyxin B sulfate
Name Of Allergen 20: petrolatum-phenylenediamine
Name Of Allergen 51: tea tree oil, oxidized
Name Of Allergen 38: iodopropyynyl betaine
Name Of Allergen 59: 4-chloro-3-cresol
Name Of Allergen 5: DMDM hydantoin
Detail Level: Zone
Number Of Patches Read: 80
Name Of Allergen 60: benzalkonium chloride
Name Of Allergen 65: compositae mix
Name Of Allergen 6: fragrance mix
Name Of Allergen 7: colophony
Name Of Allergen 36: lidocaine-hci
Name Of Allergen 53: propolis
Name Of Allergen 45: decyl glucoside
Name Of Allergen 4: potassium dichromate
Name Of Allergen 22: mercapto mix A
Name Of Allergen 9: methylisothiazolinone
Name Of Allergen 79: 2-ethylhexyl-4-methoxycinnamate
Show Allergen Counseling In The Note?: Yes
Name Of Allergen 26: benzocaine
Name Of Allergen 41: mixed dialkyl thioureas
Name Of Allergen 19: methyldibromo glutaronitrile

## 2022-02-11 ENCOUNTER — RX ONLY (OUTPATIENT)
Age: 67
Setting detail: RX ONLY
End: 2022-02-11

## 2022-02-11 RX ORDER — TACROLIMUS 1 MG/G
60 GRAMS OINTMENT TOPICAL QD
Qty: 60 | Refills: 3 | Status: ERX | COMMUNITY
Start: 2022-02-10

## 2022-03-24 ENCOUNTER — APPOINTMENT (RX ONLY)
Dept: URBAN - METROPOLITAN AREA CLINIC 4 | Facility: CLINIC | Age: 67
Setting detail: DERMATOLOGY
End: 2022-03-24

## 2022-03-24 DIAGNOSIS — L259 CONTACT DERMATITIS AND OTHER ECZEMA, UNSPECIFIED CAUSE: ICD-10-CM

## 2022-03-24 PROBLEM — L30.8 OTHER SPECIFIED DERMATITIS: Status: ACTIVE | Noted: 2022-03-24

## 2022-03-24 PROCEDURE — ? PRESCRIPTION

## 2022-03-24 PROCEDURE — ? COUNSELING

## 2022-03-24 PROCEDURE — ? DIAGNOSIS COMMENT

## 2022-03-24 PROCEDURE — 99214 OFFICE O/P EST MOD 30 MIN: CPT

## 2022-03-24 RX ORDER — CRISABOROLE 20 MG/G
1 OINTMENT TOPICAL QD
Qty: 60 | Refills: 0 | Status: ERX

## 2022-03-24 NOTE — PROCEDURE: DIAGNOSIS COMMENT
Detail Level: Generalized
Comment: Hands much improved on eucrisa. \\n\\n2/8: newton patch testing negative. Eczema as a child. Most likely eczema vs irritant. Discussed Dupixent and eucrisa. Pt decided on eucrisa. Sending RX to [facility] today. Return to office in 6 weeks. \\n\\nPrior: Couldn’t get access to metal series, will run regular ACDS 80 panel. \\n\\nPrior: Will run PA for patch testing including metal series. Pt has had some improvement from topical regimen, will now use 3x a week as a maintenance for hands. \\n\\nPrior: Hx of eczema. Has some know allergies from food allergen test when the patient was younger. States it’s gotten worse when using certain gloves at work. Will schedule for patch testing.
Render Risk Assessment In Note?: no

## 2023-12-08 ENCOUNTER — APPOINTMENT (OUTPATIENT)
Dept: ADMISSIONS | Facility: MEDICAL CENTER | Age: 68
End: 2023-12-08
Attending: ORTHOPAEDIC SURGERY
Payer: MEDICARE

## 2023-12-12 ENCOUNTER — PRE-ADMISSION TESTING (OUTPATIENT)
Dept: ADMISSIONS | Facility: MEDICAL CENTER | Age: 68
End: 2023-12-12
Attending: ORTHOPAEDIC SURGERY
Payer: MEDICARE

## 2023-12-12 DIAGNOSIS — Z01.810 PRE-OPERATIVE CARDIOVASCULAR EXAMINATION: ICD-10-CM

## 2023-12-12 RX ORDER — COVID-19 ANTIGEN TEST
KIT MISCELLANEOUS PRN
COMMUNITY

## 2023-12-12 RX ORDER — ALLOPURINOL 100 MG/1
2 TABLET ORAL EVERY MORNING
COMMUNITY

## 2023-12-12 RX ORDER — IBUPROFEN, ACETAMINOPHEN 125; 250 MG/1; MG/1
TABLET, FILM COATED ORAL PRN
COMMUNITY

## 2023-12-12 RX ORDER — ALBUTEROL SULFATE 90 UG/1
AEROSOL, METERED RESPIRATORY (INHALATION) PRN
COMMUNITY

## 2023-12-12 NOTE — PREPROCEDURE INSTRUCTIONS
PreAdmit Telephone Appointment: Reviewed the Preparing for your procedure handout with patient over the phone. Patient instructed per pharmacy guidelines regarding taking, holding or contacting provider for instructions on regularly prescribed medications before surgery. Instructed to take the following medications the day of surgery with a sip of water per pharmacy medication guidelines: Tylenol if needed, Allopurinol, Ventolin inhaler if needed       Confirmed with patient where to check in morning of surgery. Handouts/Brochure/Video emailed to patient. Pt denies issues with anesthesia

## 2023-12-13 ENCOUNTER — APPOINTMENT (OUTPATIENT)
Dept: ADMISSIONS | Facility: MEDICAL CENTER | Age: 68
End: 2023-12-13
Attending: ORTHOPAEDIC SURGERY
Payer: MEDICARE

## 2023-12-13 DIAGNOSIS — Z01.810 PRE-OPERATIVE CARDIOVASCULAR EXAMINATION: ICD-10-CM

## 2023-12-13 LAB — EKG IMPRESSION: NORMAL

## 2023-12-13 PROCEDURE — 93005 ELECTROCARDIOGRAM TRACING: CPT

## 2023-12-13 PROCEDURE — 93010 ELECTROCARDIOGRAM REPORT: CPT | Performed by: INTERNAL MEDICINE

## 2023-12-22 ENCOUNTER — HOSPITAL ENCOUNTER (OUTPATIENT)
Facility: MEDICAL CENTER | Age: 68
End: 2023-12-22
Attending: ORTHOPAEDIC SURGERY | Admitting: ORTHOPAEDIC SURGERY
Payer: MEDICARE

## 2023-12-22 ENCOUNTER — ANESTHESIA EVENT (OUTPATIENT)
Dept: SURGERY | Facility: MEDICAL CENTER | Age: 68
End: 2023-12-22
Payer: MEDICARE

## 2023-12-22 ENCOUNTER — ANESTHESIA (OUTPATIENT)
Dept: SURGERY | Facility: MEDICAL CENTER | Age: 68
End: 2023-12-22
Payer: MEDICARE

## 2023-12-22 VITALS
DIASTOLIC BLOOD PRESSURE: 82 MMHG | HEIGHT: 72 IN | OXYGEN SATURATION: 96 % | BODY MASS INDEX: 29.25 KG/M2 | RESPIRATION RATE: 16 BRPM | SYSTOLIC BLOOD PRESSURE: 127 MMHG | WEIGHT: 215.94 LBS | TEMPERATURE: 97 F | HEART RATE: 66 BPM

## 2023-12-22 PROCEDURE — 700105 HCHG RX REV CODE 258: Performed by: STUDENT IN AN ORGANIZED HEALTH CARE EDUCATION/TRAINING PROGRAM

## 2023-12-22 PROCEDURE — 700111 HCHG RX REV CODE 636 W/ 250 OVERRIDE (IP): Performed by: ORTHOPAEDIC SURGERY

## 2023-12-22 PROCEDURE — 160048 HCHG OR STATISTICAL LEVEL 1-5: Performed by: ORTHOPAEDIC SURGERY

## 2023-12-22 PROCEDURE — 700102 HCHG RX REV CODE 250 W/ 637 OVERRIDE(OP): Performed by: STUDENT IN AN ORGANIZED HEALTH CARE EDUCATION/TRAINING PROGRAM

## 2023-12-22 PROCEDURE — 160009 HCHG ANES TIME/MIN: Performed by: ORTHOPAEDIC SURGERY

## 2023-12-22 PROCEDURE — 160035 HCHG PACU - 1ST 60 MINS PHASE I: Performed by: ORTHOPAEDIC SURGERY

## 2023-12-22 PROCEDURE — 160046 HCHG PACU - 1ST 60 MINS PHASE II: Performed by: ORTHOPAEDIC SURGERY

## 2023-12-22 PROCEDURE — 160002 HCHG RECOVERY MINUTES (STAT): Performed by: ORTHOPAEDIC SURGERY

## 2023-12-22 PROCEDURE — 700101 HCHG RX REV CODE 250: Performed by: STUDENT IN AN ORGANIZED HEALTH CARE EDUCATION/TRAINING PROGRAM

## 2023-12-22 PROCEDURE — A9270 NON-COVERED ITEM OR SERVICE: HCPCS | Performed by: STUDENT IN AN ORGANIZED HEALTH CARE EDUCATION/TRAINING PROGRAM

## 2023-12-22 PROCEDURE — 700111 HCHG RX REV CODE 636 W/ 250 OVERRIDE (IP): Mod: JZ | Performed by: STUDENT IN AN ORGANIZED HEALTH CARE EDUCATION/TRAINING PROGRAM

## 2023-12-22 PROCEDURE — 700101 HCHG RX REV CODE 250: Performed by: ORTHOPAEDIC SURGERY

## 2023-12-22 PROCEDURE — 160041 HCHG SURGERY MINUTES - EA ADDL 1 MIN LEVEL 4: Performed by: ORTHOPAEDIC SURGERY

## 2023-12-22 PROCEDURE — 160036 HCHG PACU - EA ADDL 30 MINS PHASE I: Performed by: ORTHOPAEDIC SURGERY

## 2023-12-22 PROCEDURE — 700105 HCHG RX REV CODE 258: Performed by: ORTHOPAEDIC SURGERY

## 2023-12-22 PROCEDURE — C1763 CONN TISS, NON-HUMAN: HCPCS | Performed by: ORTHOPAEDIC SURGERY

## 2023-12-22 PROCEDURE — 160025 RECOVERY II MINUTES (STATS): Performed by: ORTHOPAEDIC SURGERY

## 2023-12-22 PROCEDURE — 160029 HCHG SURGERY MINUTES - 1ST 30 MINS LEVEL 4: Performed by: ORTHOPAEDIC SURGERY

## 2023-12-22 PROCEDURE — C1713 ANCHOR/SCREW BN/BN,TIS/BN: HCPCS | Performed by: ORTHOPAEDIC SURGERY

## 2023-12-22 DEVICE — ANCHOR SUTURE OMEGA PEEK OD4.75 MM 1 ARM KNOTLESS STERILE LATEX FREE DISPOSABLE: Type: IMPLANTABLE DEVICE | Site: SHOULDER | Status: FUNCTIONAL

## 2023-12-22 DEVICE — ANCHOR ICONIX 1-#2 FORCEFIBER 1.4MM (5EA/BX): Type: IMPLANTABLE DEVICE | Site: SHOULDER | Status: FUNCTIONAL

## 2023-12-22 DEVICE — ANCHOR SUTURE TENDON REGENETEN 8 (1/EA): Type: IMPLANTABLE DEVICE | Site: SHOULDER | Status: FUNCTIONAL

## 2023-12-22 DEVICE — ANCHOR SUTURE BONE ARTHROSCOPIC DELIVERY SYSTEM (1/EA): Type: IMPLANTABLE DEVICE | Site: SHOULDER | Status: FUNCTIONAL

## 2023-12-22 DEVICE — IMPLANT BIOABSORBABLE BIOINDUCTIVE IMPLANT REGENETEN LARGE (1/EA): Type: IMPLANTABLE DEVICE | Site: SHOULDER | Status: FUNCTIONAL

## 2023-12-22 RX ORDER — ONDANSETRON 2 MG/ML
4 INJECTION INTRAMUSCULAR; INTRAVENOUS
Status: DISCONTINUED | OUTPATIENT
Start: 2023-12-22 | End: 2023-12-22 | Stop reason: HOSPADM

## 2023-12-22 RX ORDER — BUPIVACAINE HYDROCHLORIDE AND EPINEPHRINE 2.5; 5 MG/ML; UG/ML
INJECTION, SOLUTION EPIDURAL; INFILTRATION; INTRACAUDAL; PERINEURAL
Status: DISCONTINUED | OUTPATIENT
Start: 2023-12-22 | End: 2023-12-22 | Stop reason: HOSPADM

## 2023-12-22 RX ORDER — HALOPERIDOL 5 MG/ML
1 INJECTION INTRAMUSCULAR
Status: DISCONTINUED | OUTPATIENT
Start: 2023-12-22 | End: 2023-12-22 | Stop reason: HOSPADM

## 2023-12-22 RX ORDER — OXYCODONE HCL 5 MG/5 ML
10 SOLUTION, ORAL ORAL
Status: DISCONTINUED | OUTPATIENT
Start: 2023-12-22 | End: 2023-12-22 | Stop reason: HOSPADM

## 2023-12-22 RX ORDER — ACETAMINOPHEN 500 MG
1000 TABLET ORAL ONCE
Status: COMPLETED | OUTPATIENT
Start: 2023-12-22 | End: 2023-12-22

## 2023-12-22 RX ORDER — LABETALOL HYDROCHLORIDE 5 MG/ML
5 INJECTION, SOLUTION INTRAVENOUS
Status: DISCONTINUED | OUTPATIENT
Start: 2023-12-22 | End: 2023-12-22 | Stop reason: HOSPADM

## 2023-12-22 RX ORDER — ONDANSETRON 2 MG/ML
INJECTION INTRAMUSCULAR; INTRAVENOUS PRN
Status: DISCONTINUED | OUTPATIENT
Start: 2023-12-22 | End: 2023-12-22 | Stop reason: SURG

## 2023-12-22 RX ORDER — EPHEDRINE SULFATE 50 MG/ML
5 INJECTION, SOLUTION INTRAVENOUS
Status: DISCONTINUED | OUTPATIENT
Start: 2023-12-22 | End: 2023-12-22 | Stop reason: HOSPADM

## 2023-12-22 RX ORDER — CEFAZOLIN SODIUM 1 G/3ML
INJECTION, POWDER, FOR SOLUTION INTRAMUSCULAR; INTRAVENOUS PRN
Status: DISCONTINUED | OUTPATIENT
Start: 2023-12-22 | End: 2023-12-22 | Stop reason: SURG

## 2023-12-22 RX ORDER — EPHEDRINE SULFATE 50 MG/ML
INJECTION, SOLUTION INTRAVENOUS PRN
Status: DISCONTINUED | OUTPATIENT
Start: 2023-12-22 | End: 2023-12-22 | Stop reason: SURG

## 2023-12-22 RX ORDER — HYDROMORPHONE HYDROCHLORIDE 1 MG/ML
0.1 INJECTION, SOLUTION INTRAMUSCULAR; INTRAVENOUS; SUBCUTANEOUS
Status: DISCONTINUED | OUTPATIENT
Start: 2023-12-22 | End: 2023-12-22 | Stop reason: HOSPADM

## 2023-12-22 RX ORDER — SODIUM CHLORIDE, SODIUM LACTATE, POTASSIUM CHLORIDE, CALCIUM CHLORIDE 600; 310; 30; 20 MG/100ML; MG/100ML; MG/100ML; MG/100ML
INJECTION, SOLUTION INTRAVENOUS CONTINUOUS
Status: ACTIVE | OUTPATIENT
Start: 2023-12-22 | End: 2023-12-22

## 2023-12-22 RX ORDER — DIPHENHYDRAMINE HYDROCHLORIDE 50 MG/ML
12.5 INJECTION INTRAMUSCULAR; INTRAVENOUS
Status: DISCONTINUED | OUTPATIENT
Start: 2023-12-22 | End: 2023-12-22 | Stop reason: HOSPADM

## 2023-12-22 RX ORDER — LIDOCAINE HYDROCHLORIDE 20 MG/ML
INJECTION, SOLUTION EPIDURAL; INFILTRATION; INTRACAUDAL; PERINEURAL PRN
Status: DISCONTINUED | OUTPATIENT
Start: 2023-12-22 | End: 2023-12-22 | Stop reason: SURG

## 2023-12-22 RX ORDER — ROCURONIUM BROMIDE 10 MG/ML
INJECTION, SOLUTION INTRAVENOUS PRN
Status: DISCONTINUED | OUTPATIENT
Start: 2023-12-22 | End: 2023-12-22 | Stop reason: SURG

## 2023-12-22 RX ORDER — HYDROMORPHONE HYDROCHLORIDE 1 MG/ML
0.4 INJECTION, SOLUTION INTRAMUSCULAR; INTRAVENOUS; SUBCUTANEOUS
Status: DISCONTINUED | OUTPATIENT
Start: 2023-12-22 | End: 2023-12-22 | Stop reason: HOSPADM

## 2023-12-22 RX ORDER — DEXAMETHASONE SODIUM PHOSPHATE 4 MG/ML
INJECTION, SOLUTION INTRA-ARTICULAR; INTRALESIONAL; INTRAMUSCULAR; INTRAVENOUS; SOFT TISSUE PRN
Status: DISCONTINUED | OUTPATIENT
Start: 2023-12-22 | End: 2023-12-22 | Stop reason: SURG

## 2023-12-22 RX ORDER — OXYCODONE HCL 5 MG/5 ML
5 SOLUTION, ORAL ORAL
Status: DISCONTINUED | OUTPATIENT
Start: 2023-12-22 | End: 2023-12-22 | Stop reason: HOSPADM

## 2023-12-22 RX ORDER — EPINEPHRINE 1 MG/ML(1)
AMPUL (ML) INJECTION
Status: DISCONTINUED | OUTPATIENT
Start: 2023-12-22 | End: 2023-12-22 | Stop reason: HOSPADM

## 2023-12-22 RX ORDER — LEVOTHYROXINE SODIUM 0.05 MG/1
50 TABLET ORAL
COMMUNITY

## 2023-12-22 RX ORDER — SODIUM CHLORIDE, SODIUM LACTATE, POTASSIUM CHLORIDE, CALCIUM CHLORIDE 600; 310; 30; 20 MG/100ML; MG/100ML; MG/100ML; MG/100ML
INJECTION, SOLUTION INTRAVENOUS CONTINUOUS
Status: DISCONTINUED | OUTPATIENT
Start: 2023-12-22 | End: 2023-12-22 | Stop reason: HOSPADM

## 2023-12-22 RX ORDER — HYDRALAZINE HYDROCHLORIDE 20 MG/ML
5 INJECTION INTRAMUSCULAR; INTRAVENOUS
Status: DISCONTINUED | OUTPATIENT
Start: 2023-12-22 | End: 2023-12-22 | Stop reason: HOSPADM

## 2023-12-22 RX ORDER — HYDROMORPHONE HYDROCHLORIDE 1 MG/ML
0.2 INJECTION, SOLUTION INTRAMUSCULAR; INTRAVENOUS; SUBCUTANEOUS
Status: DISCONTINUED | OUTPATIENT
Start: 2023-12-22 | End: 2023-12-22 | Stop reason: HOSPADM

## 2023-12-22 RX ADMIN — ROCURONIUM BROMIDE 70 MG: 50 INJECTION, SOLUTION INTRAVENOUS at 12:55

## 2023-12-22 RX ADMIN — SUGAMMADEX 200 MG: 100 INJECTION, SOLUTION INTRAVENOUS at 14:10

## 2023-12-22 RX ADMIN — SODIUM CHLORIDE, POTASSIUM CHLORIDE, SODIUM LACTATE AND CALCIUM CHLORIDE: 600; 310; 30; 20 INJECTION, SOLUTION INTRAVENOUS at 12:22

## 2023-12-22 RX ADMIN — CEFAZOLIN 2 G: 1 INJECTION, POWDER, FOR SOLUTION INTRAMUSCULAR; INTRAVENOUS at 12:57

## 2023-12-22 RX ADMIN — ACETAMINOPHEN 1000 MG: 500 TABLET ORAL at 12:22

## 2023-12-22 RX ADMIN — FENTANYL CITRATE 50 MCG: 50 INJECTION, SOLUTION INTRAMUSCULAR; INTRAVENOUS at 12:40

## 2023-12-22 RX ADMIN — FENTANYL CITRATE 50 MCG: 50 INJECTION, SOLUTION INTRAMUSCULAR; INTRAVENOUS at 13:12

## 2023-12-22 RX ADMIN — ONDANSETRON 4 MG: 2 INJECTION INTRAMUSCULAR; INTRAVENOUS at 14:10

## 2023-12-22 RX ADMIN — EPHEDRINE SULFATE 5 MG: 50 INJECTION, SOLUTION INTRAVENOUS at 12:54

## 2023-12-22 RX ADMIN — SODIUM CHLORIDE, POTASSIUM CHLORIDE, SODIUM LACTATE AND CALCIUM CHLORIDE: 600; 310; 30; 20 INJECTION, SOLUTION INTRAVENOUS at 14:55

## 2023-12-22 RX ADMIN — DEXAMETHASONE SODIUM PHOSPHATE 4 MG: 4 INJECTION INTRA-ARTICULAR; INTRALESIONAL; INTRAMUSCULAR; INTRAVENOUS; SOFT TISSUE at 12:59

## 2023-12-22 RX ADMIN — LIDOCAINE HYDROCHLORIDE 100 MG: 20 INJECTION, SOLUTION EPIDURAL; INFILTRATION; INTRACAUDAL at 12:54

## 2023-12-22 RX ADMIN — PROPOFOL 200 MG: 10 INJECTION, EMULSION INTRAVENOUS at 12:54

## 2023-12-22 ASSESSMENT — PAIN DESCRIPTION - PAIN TYPE
TYPE: SURGICAL PAIN
TYPE: ACUTE PAIN

## 2023-12-22 NOTE — OR NURSING
1420: Patient [place] to PAC[place]. Report received from anesthesia and RN. Respirations are spontaneous and unlabored. VSS on 6L. Dressing in place with moderate amount of shadowing underneath dressing. Cold pack applied. RUE elevated. RUE; radial pulse is 2+, cap refill less than 3 seconds, warm pink. OPA in place.     1430: OPA removed. Respirations even and unlabored.    1450: Dr. Lopez to bedside. Patient denies pain and nausea. Reports tingling to fingers.    1500: Wife updated via phone, in surgical WR.     1520: Recovery completed at this time. Patient meets criteria for transfer to stage 2. Report given to RN.    1526: CNA at bedside to transfer patient to stage 2.

## 2023-12-22 NOTE — ANESTHESIA PROCEDURE NOTES
Airway    Date/Time: 12/22/2023 12:57 PM    Performed by: Leandro Anderson D.O.  Authorized by: Leandro Anderson D.O.    Location:  OR  Urgency:  Elective  Difficult Airway: No    Indications for Airway Management:  Anesthesia      Spontaneous Ventilation: absent    Sedation Level:  Deep  Preoxygenated: Yes    Patient Position:  Sniffing  Final Airway Type:  Endotracheal airway  Final Endotracheal Airway:  ETT  Cuffed: Yes    Technique Used for Successful ETT Placement:  Direct laryngoscopy    Insertion Site:  Oral  Blade Type:  Lerner  Laryngoscope Blade/Videolaryngoscope Blade Size:  3  ETT Size (mm):  7.0  Measured from:  Teeth  ETT to Teeth (cm):  21  Placement Verified by: auscultation and capnometry    Cormack-Lehane Classification:  Grade I - full view of glottis  Number of Attempts at Approach:  1

## 2023-12-22 NOTE — ANESTHESIA PROCEDURE NOTES
Peripheral IV    Date/Time: 12/22/2023 12:50 PM    Performed by: Leandro Anderson D.O.  Authorized by: Leandro Anderson D.O.    Size:  20 G  Laterality:  Left  Peripheral IV Location:  Forearm  Local Anesthetic:  None  Site Prep:  Betadine  Technique:  Direct puncture  Attempts:  2   PIV from Pre-op was pulled during patient movement to OR table, new IV was required prior to induction

## 2023-12-22 NOTE — OR NURSING
1526: Patient arrived from PACU via gurney.  RUE dressing CDI; fingers warm touch, able to wiggle on request. Cold pack, immobilizer, and block in place.    Sedation/Resp Status: Alert.   Respirations spontaneous and non-labored.    1610: Patient education completed, family denies further questions.     1615: DC'd to care of family post uneventful stay in PACU 2.

## 2023-12-22 NOTE — ANESTHESIA PREPROCEDURE EVALUATION
Case: [id number] Anesthesia Start Date/Time: 12/22/23 1240    Procedures:       RIGHT SHOULDER ARTHROSCOPY, SUBACROMIAL DECOMPRESSION, DISTAL CLAVICLE EXCISION, DEBRIDEMENT, POSSIBLE BICEPS TENODESIS, ROTATOR CUFF REPAIR, POSSIBLE BIO INDUCTIVE IMPLANT AND REPAIRS AS INDICATED      EXCISION, CLAVICLE, DISTAL    Pre-op diagnosis: M75.101    Location:  OR  / SURGERY [facility]    Surgeons: Maral Lopez M.D.            Relevant Problems   No relevant active problems       Physical Exam    Airway   Mallampati: II  TM distance: >3 FB  Neck ROM: full       Cardiovascular - normal exam  Rhythm: regular  Rate: normal  (-) murmur     Dental - normal exam           Pulmonary - normal exam  Breath sounds clear to auscultation     Abdominal    Neurological - normal exam                   Anesthesia Plan    ASA 2       Plan - general and peripheral nerve block     Peripheral nerve block will be post-op pain control  Airway plan will be ETT          Induction: intravenous    Postoperative Plan: Postoperative administration of opioids is intended.    Pertinent diagnostic labs and testing reviewed    Informed Consent:    Anesthetic plan and risks discussed with patient.    Use of blood products discussed with: patient whom consented to blood products.

## 2023-12-22 NOTE — OR NURSING
1113- Pt brought back to pre- op holding. Assumed care.     1204- During Nerve block the patient had an episode of low blood pressure, nausea, and diaphoresis. Pt laid flat.    1206-Pt BP returned to baseline.    1210- Patient allergies and NPO status verified, home medication reconciliation completed and belongings secured. Patient verbalizes understanding of pain scale, expected course of stay and plan of care. Surgical site verified with patient. IV access established. Alessandro hose and Sequentials placed on legs.

## 2023-12-22 NOTE — DISCHARGE INSTRUCTIONS
What to Expect Post Anesthesia    Rest and take it easy for the first 24 hours.  A responsible adult is recommended to remain with you during that time.  It is normal to feel sleepy.  We encourage you to not do anything that requires balance, judgment or coordination.    FOR 24 HOURS DO NOT:  Drive, operate machinery or run household appliances.  Drink beer or alcoholic beverages.  Make important decisions or sign legal documents.    To avoid nausea, slowly advance diet as tolerated, avoiding spicy or greasy foods for the first day.  Add more substantial food to your diet according to your provider's instructions.  Babies can be fed formula or breast milk as soon as they are hungry.  INCREASE FLUIDS AND FIBER TO AVOID CONSTIPATION.    MILD FLU-LIKE SYMPTOMS ARE NORMAL.  YOU MAY EXPERIENCE GENERALIZED MUSCLE ACHES, THROAT IRRITATION, HEADACHE AND/OR SOME NAUSEA.    If any questions arise, call your provider.  If your provider is not available, please feel free to call the Surgical Center at [phone].    MEDICATIONS: Resume taking daily medication.  Take prescribed pain medication with food.  If no medication is prescribed, you may take non-aspirin pain medication if needed.  PAIN MEDICATION CAN BE VERY CONSTIPATING.  Take a stool softener or laxative such as senokot, pericolace, or milk of magnesia if needed.    Last pain medication given at: N/A    Shoulder Arthroscopy Post-Operative Instructions     Nerve Block:  The effects of the nerve block may last from 12-36 hours depending on the medication used.   It is recommended to sleep in a semi upright position for the first few days as the nerve block may cause shortness of breath when lying flat.        Dressings/shower: Keep your shoulder dressing clean and dry after surgery.  You may remove your post-operative dressing three days following surgery.  You may shower then allowing soap and water to run over incisions.  Carefully dry the surgical area with a  clean cloth.  Then, cover your incisions with band-aids or a light dressing which can be changed as needed until sutures are removed at your first post-op appointment.     Baths/Pools/Hot Tubs.  Please do not submerge your incision in a hot tub, pool, or bath until at least six weeks after surgery.  Make sure that your incision is healed with no remaining scab or drainage before submerging in water.     Compression Hose/KATIE hose: Compression hose/KATIE hose will be placed on your legs prior to surgery at the hospital.  Please keep these on for at least two days, as they help control leg swelling as well as reduce the risk of a blood clot.   You may remove the compression hose temporarily to shower.     Ice: Use ice or cold therapy to your shoulder as you feel necessary to help with the pain and swelling.  Typically, this is 20 minutes on and 20 minutes off for the first several days following surgery.  Cold therapy units can be used as directed.        Sling:  Please wear your sling when walking about and when sleeping.  You may carefully remove the sling when awake and seated.  Please support your forearm on a pillow when the sling is removed. You may remove the pillow portion of the sling after two weeks. If you have questions regarding how to wear sling or need a replacement, please contact [facility] @ [phone].     Activity:  You may remove your sling when awake and seated.  Please support your forearm on a pillow.  Once your sling is removed, you may move your elbow, wrist and hand as tolerated. Please do not lift anything heavier than a cup of coffee on your operative side.  Codman/pendulum exercises are encouraged 3-5 times a day. You will need to remove the sling to perform these exercises.     Pain Medication:  Take your pain medication as prescribed, only as needed.  Do not drive or operate machinery while taking narcotic pain medication.  You may resume anti-inflammatory medications any time after  surgery. Please take medication with food to avoid stomach upset.     Aspirin:  Please take Aspirin 81 mg twice daily starting the morning after surgery and continue for 2 weeks, to help prevent a blood clot.     Driving:  Please arrange a ride to and from the hospital/surgery center on the day of surgery.   You may drive as soon as you are off pain medication and feel comfortable behind the wheel.     Physical Therapy:  Contact a physical therapy facility approved by your insurance plan to schedule your initial visits.  Typically, physical therapy is scheduled twice weekly to begin at approximately two weeks after surgery unless otherwise noted.     Follow-up:  Your first post-op appointment should be scheduled 10-14 days after surgery.  The details of your procedure and specifics of rehabilitation will be discussed.     Problems/concerns: If you have a problem or significant concern (unexpected pain, excessive bleeding or discharge from your incisions, fever or chills) or do not hesitate to call the office @ [phone] or go to your local emergency room.     Maral Lopez MD     [facility]   [address]  [place], [address]   [place], [place] [address]     Office telephone:  [phone]   Office fax: [phone]

## 2023-12-22 NOTE — OR SURGEON
Immediate Post OP Note    PreOp Diagnosis: RIGHT shoulder impingement, AC joint OA, SLAP lesion, RCT       PostOp Diagnosis: SAME      Procedure(s):  RIGHT SHOULDER ARTHROSCOPY, SUBACROMIAL DECOMPRESSION, DISTAL CLAVICLE EXCISION, LABRAL DEBRIDEMENT, BICEPS TENODESIS, ROTATOR CUFF REPAIR, BIO INDUCTIVE IMPLANT - Wound Class: Clean    Surgeon(s):  Maral Lopez M.D.    Anesthesiologist/Type of Anesthesia:  Anesthesiologist: Leandro Anderson D.O./General    Surgical Staff:  Circulator: Doreen Ny Jr. RShekharNShekhar  Scrub Person: Giovani Gaitan    Specimens removed if any:  * No specimens in log *    Estimated Blood Loss: min    Findings: as above    Complications: none    Bennett,         12/22/2023 2:16 PM Maral Lopez M.D.

## 2023-12-23 NOTE — OP REPORT
DATE OF SERVICE:  12/22/2023     NO DICTATION.        ______________________________  MD JOSE BLANKENSHIP/CHANTEL    DD:  12/22/2023 14:20  DT:  12/22/2023 16:43    Job#:  [id number]

## 2023-12-23 NOTE — OP REPORT
DATE OF SERVICE:  12/22/2023     PREOPERATIVE DIAGNOSES:  Right shoulder impingement syndrome,   acromioclavicular joint osteoarthritis, SLAP lesion with proximal biceps   tendinopathy, rotator cuff tear including subscapularis tendon.     POSTOPERATIVE DIAGNOSES:  Right shoulder impingement syndrome,   acromioclavicular joint osteoarthritis, SLAP lesion with proximal biceps   tendinopathy, rotator cuff tear including subscapularis tendon.     PROCEDURES:  Right shoulder arthroscopy, subacromial decompression,   arthroscopic distal clavicle excision, labral debridement, arthroscopic   suprapectoral proximal biceps tenodesis, arthroscopic rotator cuff repair   including subscapularis tendon, bio-inductive implant.     SURGEON:  Maral Lopez MD     ASSISTANT:  Jesus Musa CFA     ANESTHESIOLOGIST:  Leandro Anderson DO     TYPE OF ANESTHESIA:  General, with preoperative interscalene nerve block.     INTRAVENOUS FLUID:  One liter crystalloid.     ESTIMATED BLOOD LOSS:  Minimal.     DRAINS:  None.     SPECIMENS:  None.     COMPLICATIONS:  None.     IMPLANTS:  [place] Iconix anchors x2, Omega anchor x1, Smith [employer] Nephew large   Regeneten bio-inductive implant.     REASON FOR PROCEDURE:  The patient is a 68-year-old male with right shoulder   pain, with an MRI and plain x-ray findings reviewed.  He failed to   definitively respond to nonoperative measures.  We decided to proceed with   arthroscopy.     OPERATION:  The patient was given a right interscalene nerve block by the   anesthesiologist before surgery.  Once back in the operating room, a breathing   tube was placed.  He was given 2 grams of IV Ancef.  He was then placed in   the lateral decubitus position.  Care was taken to pad his axilla as well as   all bony prominences.  The right shoulder was then examined under anesthesia.    He was noted to have good range of motion, without instability.  The right   upper extremity was prepped with ChloraPrep and draped in  standard sterile   fashion.  It was then placed in the Arthrex traction device.  Bony landmarks   were drawn and a standard posterior portal was established.  The arthroscope   was then inserted into the glenohumeral joint.  An anterosuperior cannula was   placed in the rotator interval, just beneath the biceps tendon.  There was a   mildly retracted upper rolled edge subscapularis tendon tear.  This was   addressed first.  The lesser tuberosity was roughened and 2 tapes were placed   with a SlingShot through the subscapularis tendon tear.  All four tails were   then placed into a PEEK anchor, then an Omega anchor followed with the sutures   tensioned appropriately.  This allowed for excellent repair of the upper   rolled edge of the subscapularis tendon.  There were mild degenerative changes   noted in the glenohumeral joint.  A smoothing chondroplasty was performed.    There was no exposed bone or significant osteochondral defect.  There was   global labral degenerative tearing.  This was debrided circumferentially.  Due   to the subscapularis tendon tear and a medial biceps pulley lesion, a biceps   tenodesis was then performed.  This was done by placing the arthroscope into   the suprapectoral region.  The bicipital groove was identified.  The groove   itself was roughened.  A percutaneous portal was established and the first of   two Iconix anchors was drilled and tapped into place.  The Cobra device was   used to take a cinch suture through the biceps.  The free limb was then passed   from underneath the biceps through the upper transverse fibers of the   pectoralis.  A second Iconix anchor was drilled and tapped into place.  A   cinch suture was taken with the Cobra device.  The more proximal anchor was   tied down first followed by the more distal one.  This allowed for excellent   compressive onlay tenodesis of the biceps.  The arthroscope was then placed   into the glenohumeral joint.  The biceps tendon  was simply released off of the   superior glenoid tubercle.  Notably, there was a partial thickness rotator   cuff tear noted.  This was relatively low-grade from the articular side.  This   was simply marked with a PDS suture.  Next, the arthroscope was placed back   into the suprapectoral region where the biceps tendon was tenotomized proximal   to the tenodesis site with the segment removed.  Attention was then turned to   the subacromial space.  A subacromial decompression was carried out.  This   was performed with the 5.5 mm resector creating a nice, smooth edge and   undersurface to the acromion.  The lateral aspect of the acromioplasty was   completed.  Next, attention was turned to the distal clavicle.  The distal   clavicle was excised using the 5.5 mm resector.  Approximately 1 cm of bone   was removed.  It was taken back to a nice, smooth edge.  Next, attention was   turned to the rotator cuff.  The area that had been marked with a PDS suture   was evaluated.  There was mild thinning noted and diffuse fraying.  The   decision was made to perform a repair with a bio-inductive implant.  A large   Regeneten bio-inductive implant was inserted from the lateral portal.  A   percutaneous portal had been established for the special cannula through which   the PDS tendon anchors would be placed.  The tendon anchors were then placed   through the cannula to secure the graft at its periphery.  An excellent repair   had been achieved.  All instruments were then removed.  Portals were closed   with 3-0 nylon suture.  A sterile dressing was applied.  All drapes were   removed and the arm was carefully taken out of the traction device and placed   into a shoulder abduction sling.  The patient was placed supine on a stretcher   and taken to recovery room, in stable condition.        ______________________________  MD JOSE BLANKENSHIP/CHANTEL    DD:  12/22/2023 14:27  DT:  12/22/2023 16:36    Job#:  [id number]

## 2023-12-23 NOTE — ANESTHESIA TIME REPORT
Anesthesia Start and Stop Event Times       Date Time Event    12/22/2023 1240 Ready for Procedure     1240 Anesthesia Start     1435 Anesthesia Stop          Responsible Staff  12/22/23      Name Role Begin End    Leandro Anderson D.O. Anesth 1240 1435          Overtime Reason:  no overtime (within assigned shift)    Comments:

## 2023-12-23 NOTE — ANESTHESIA POSTPROCEDURE EVALUATION
Patient: Ezra Obrien    Procedure Summary       Date: 12/22/23 Room / Location:  OR  / SURGERY [facility]    Anesthesia Start: 1240 Anesthesia Stop: 1435    Procedure: RIGHT SHOULDER ARTHROSCOPY, SUBACROMIAL DECOMPRESSION, DISTAL CLAVICLE EXCISION, LABRAL DEBRIDEMENT, BICEPS TENODESIS, ROTATOR CUFF REPAIR, BIO INDUCTIVE IMPLANT (Right: Shoulder) Diagnosis: (M75.101)    Surgeons: Maral Lopez M.D. Responsible Provider: Leandro Anderson D.O.    Anesthesia Type: general, peripheral nerve block ASA Status: 2            Final Anesthesia Type: general, peripheral nerve block  Last vitals  BP   Blood Pressure : 127/82    Temp   36.1 °C (97 °F)    Pulse   66   Resp   16    SpO2   96 %      Anesthesia Post Evaluation    Patient location during evaluation: PACU  Patient participation: complete - patient participated  Level of consciousness: awake and alert    Airway patency: patent  Anesthetic complications: no  Cardiovascular status: hemodynamically stable  Respiratory status: acceptable  Hydration status: euvolemic    PONV: none          No notable events documented.     Nurse Pain Score: 0 (NPRS)

## 2024-12-11 ENCOUNTER — APPOINTMENT (OUTPATIENT)
Dept: URBAN - METROPOLITAN AREA CLINIC 6 | Facility: CLINIC | Age: 69
Setting detail: DERMATOLOGY
End: 2024-12-11

## 2024-12-11 DIAGNOSIS — L81.4 OTHER MELANIN HYPERPIGMENTATION: ICD-10-CM

## 2024-12-11 DIAGNOSIS — L82.1 OTHER SEBORRHEIC KERATOSIS: ICD-10-CM

## 2024-12-11 DIAGNOSIS — L57.0 ACTINIC KERATOSIS: ICD-10-CM

## 2024-12-11 DIAGNOSIS — D18.0 HEMANGIOMA: ICD-10-CM

## 2024-12-11 DIAGNOSIS — Z71.89 OTHER SPECIFIED COUNSELING: ICD-10-CM

## 2024-12-11 DIAGNOSIS — D22 MELANOCYTIC NEVI: ICD-10-CM

## 2024-12-11 DIAGNOSIS — L259 CONTACT DERMATITIS AND OTHER ECZEMA, UNSPECIFIED CAUSE: ICD-10-CM | Status: INADEQUATELY CONTROLLED

## 2024-12-11 PROBLEM — D22.61 MELANOCYTIC NEVI OF RIGHT UPPER LIMB, INCLUDING SHOULDER: Status: ACTIVE | Noted: 2024-12-11

## 2024-12-11 PROBLEM — D22.71 MELANOCYTIC NEVI OF RIGHT LOWER LIMB, INCLUDING HIP: Status: ACTIVE | Noted: 2024-12-11

## 2024-12-11 PROBLEM — D22.5 MELANOCYTIC NEVI OF TRUNK: Status: ACTIVE | Noted: 2024-12-11

## 2024-12-11 PROBLEM — D22.62 MELANOCYTIC NEVI OF LEFT UPPER LIMB, INCLUDING SHOULDER: Status: ACTIVE | Noted: 2024-12-11

## 2024-12-11 PROBLEM — D18.01 HEMANGIOMA OF SKIN AND SUBCUTANEOUS TISSUE: Status: ACTIVE | Noted: 2024-12-11

## 2024-12-11 PROBLEM — D22.72 MELANOCYTIC NEVI OF LEFT LOWER LIMB, INCLUDING HIP: Status: ACTIVE | Noted: 2024-12-11

## 2024-12-11 PROBLEM — L30.8 OTHER SPECIFIED DERMATITIS: Status: ACTIVE | Noted: 2024-12-11

## 2024-12-11 PROCEDURE — ? PRESCRIPTION

## 2024-12-11 PROCEDURE — 17000 DESTRUCT PREMALG LESION: CPT

## 2024-12-11 PROCEDURE — 17003 DESTRUCT PREMALG LES 2-14: CPT

## 2024-12-11 PROCEDURE — ? COUNSELING

## 2024-12-11 PROCEDURE — ? LIQUID NITROGEN

## 2024-12-11 PROCEDURE — 99214 OFFICE O/P EST MOD 30 MIN: CPT | Mod: 25

## 2024-12-11 RX ORDER — TRIAMCINOLONE ACETONIDE 1 MG/G
OINTMENT TOPICAL BID
Qty: 80 | Refills: 2 | Status: ERX | COMMUNITY
Start: 2024-12-11

## 2024-12-11 RX ADMIN — TRIAMCINOLONE ACETONIDE: 1 OINTMENT TOPICAL at 00:00

## 2024-12-11 ASSESSMENT — LOCATION SIMPLE DESCRIPTION DERM
LOCATION SIMPLE: LEFT FOREARM
LOCATION SIMPLE: LEFT THIGH
LOCATION SIMPLE: ANTERIOR SCALP
LOCATION SIMPLE: LEFT POSTERIOR THIGH
LOCATION SIMPLE: LEFT OCCIPITAL SCALP
LOCATION SIMPLE: RIGHT FOREARM
LOCATION SIMPLE: RIGHT LOWER BACK
LOCATION SIMPLE: LEFT MIDDLE FINGER
LOCATION SIMPLE: LEFT UPPER ARM
LOCATION SIMPLE: RIGHT UPPER BACK
LOCATION SIMPLE: LEFT CHEEK
LOCATION SIMPLE: SCALP
LOCATION SIMPLE: RIGHT POSTERIOR THIGH
LOCATION SIMPLE: LEFT SCALP
LOCATION SIMPLE: LEFT CALF
LOCATION SIMPLE: LEFT INDEX FINGER
LOCATION SIMPLE: POSTERIOR SCALP
LOCATION SIMPLE: RIGHT THIGH
LOCATION SIMPLE: RIGHT TEMPLE
LOCATION SIMPLE: RIGHT SCALP
LOCATION SIMPLE: LEFT UPPER BACK
LOCATION SIMPLE: RIGHT CALF
LOCATION SIMPLE: CHEST
LOCATION SIMPLE: RIGHT UPPER ARM

## 2024-12-11 ASSESSMENT — LOCATION DETAILED DESCRIPTION DERM
LOCATION DETAILED: LEFT VENTRAL DISTAL FOREARM
LOCATION DETAILED: LEFT INDEX FINGERTIP
LOCATION DETAILED: MID-FRONTAL SCALP
LOCATION DETAILED: LEFT LATERAL FRONTAL SCALP
LOCATION DETAILED: RIGHT VENTRAL PROXIMAL FOREARM
LOCATION DETAILED: RIGHT VENTRAL DISTAL FOREARM
LOCATION DETAILED: RIGHT DISTAL POSTERIOR THIGH
LOCATION DETAILED: RIGHT PROXIMAL CALF
LOCATION DETAILED: RIGHT INFERIOR TEMPLE
LOCATION DETAILED: RIGHT MEDIAL INFERIOR CHEST
LOCATION DETAILED: LEFT DISTAL LATERAL CALF
LOCATION DETAILED: LEFT PROXIMAL DORSAL FOREARM
LOCATION DETAILED: LEFT CENTRAL PARIETAL SCALP
LOCATION DETAILED: LEFT INFERIOR UPPER BACK
LOCATION DETAILED: LEFT SUPERIOR OCCIPITAL SCALP
LOCATION DETAILED: LEFT SUPERIOR PARIETAL SCALP
LOCATION DETAILED: RIGHT SUPERIOR LATERAL MIDBACK
LOCATION DETAILED: LEFT MIDDLE FINGERTIP
LOCATION DETAILED: LEFT CENTRAL MALAR CHEEK
LOCATION DETAILED: LEFT ANTERIOR PROXIMAL THIGH
LOCATION DETAILED: RIGHT ANTERIOR PROXIMAL THIGH
LOCATION DETAILED: RIGHT CENTRAL FRONTAL SCALP
LOCATION DETAILED: RIGHT ANTERIOR DISTAL UPPER ARM
LOCATION DETAILED: LEFT DISTAL POSTERIOR THIGH
LOCATION DETAILED: POSTERIOR MID-PARIETAL SCALP
LOCATION DETAILED: LEFT MEDIAL FRONTAL SCALP
LOCATION DETAILED: RIGHT MID-UPPER BACK
LOCATION DETAILED: LEFT PROXIMAL CALF
LOCATION DETAILED: RIGHT MEDIAL FRONTAL SCALP
LOCATION DETAILED: LEFT ANTERIOR DISTAL UPPER ARM
LOCATION DETAILED: RIGHT PROXIMAL DORSAL FOREARM
LOCATION DETAILED: LEFT MID-UPPER BACK

## 2024-12-11 ASSESSMENT — LOCATION ZONE DERM
LOCATION ZONE: ARM
LOCATION ZONE: LEG
LOCATION ZONE: SCALP
LOCATION ZONE: FACE
LOCATION ZONE: TRUNK
LOCATION ZONE: FINGER

## (undated) DEVICE — SENSOR OXIMETER ADULT SPO2 RD SET (20EA/BX)

## (undated) DEVICE — TOWEL STOP TIMEOUT SAFETY FLAG (40EA/CA)

## (undated) DEVICE — GOWN SURGICAL X-LARGE ULTRA - FILM-REINFORCED (20/CA)

## (undated) DEVICE — SHAVER, 5.5 RESECTOR

## (undated) DEVICE — SODIUM CHL. IRRIGATION 0.9% 3000ML (4EA/CA 65CA/PF)

## (undated) DEVICE — SUTURE 3-0 ETHILON FS-1 - (36/BX) 30 INCH

## (undated) DEVICE — SOLUTION PREP PVP IODINE 3/4 OZ POUCH PACKET CONTAINER STERILE LATEX FREE

## (undated) DEVICE — SYRINGE ASEPTO - (50EA/CA

## (undated) DEVICE — CANNULA THREADED 5X75 (5EA/BX)

## (undated) DEVICE — TUBING CASSETTE CROSSFLOW INTEGRATED (10EA/CA)

## (undated) DEVICE — SHAVER4.0 AGGRESSIVE + FORMLA (5EA/BX)

## (undated) DEVICE — TUBING DAY USE W/CARTRIDGE (10EA/BX)

## (undated) DEVICE — TAPE XBRAID TT 2.0MM (12EA/BX)

## (undated) DEVICE — SUTURE GENERAL

## (undated) DEVICE — COVER LIGHT HANDLE FLEXIBLE - SOFT (2EA/PK 80PK/CA)

## (undated) DEVICE — PACK SHOULDER ARTHROSCOPY SM - (2EA/CA)

## (undated) DEVICE — TOWELS CLOTH SURGICAL - (4/PK 20PK/CA)

## (undated) DEVICE — DRAPE IOBAN II INCISE 23X17 - (10EA/BX 4BX/CA)

## (undated) DEVICE — DRAPE SHOULDER FLUID CONTROL - 77 X 85 (10/CA)

## (undated) DEVICE — SLEEVE SHOULDER DISP(ARTHREX) - (6/BX)

## (undated) DEVICE — ABLATOR WAND SERFAS 90-S CRUISE

## (undated) DEVICE — CHLORAPREP 26 ML APPLICATOR - ORANGE TINT(25/CA)

## (undated) DEVICE — CANNULA FULLY THREADED 8 X 75 (5EA/BX)

## (undated) DEVICE — DRAPE U SPLIT IMP 54 X 76 - (24/CA)

## (undated) DEVICE — GLOVE 7.0 LF PF PROTEXIS (50PR/BX)

## (undated) DEVICE — ELECTRODE ACROMIOPLASTY CNCPT (5/BX)

## (undated) DEVICE — PENCIL ELECTSURG 10FT BTN SWH - (50/CA)

## (undated) DEVICE — GLOVE BIOGEL SZ 7 SURGICAL PF LTX - (50PR/BX 4BX/CA)

## (undated) DEVICE — DEVICE SUTURING PASSER SLINGSHOT LEFT 45 DEGREE

## (undated) DEVICE — SUTURE 2.0MM TAPE VIOLET MUST ORDER 12 EACH AT A TIME